# Patient Record
Sex: FEMALE | Race: BLACK OR AFRICAN AMERICAN
[De-identification: names, ages, dates, MRNs, and addresses within clinical notes are randomized per-mention and may not be internally consistent; named-entity substitution may affect disease eponyms.]

---

## 2019-04-05 ENCOUNTER — HOSPITAL ENCOUNTER (EMERGENCY)
Dept: HOSPITAL 74 - JERFT | Age: 29
Discharge: HOME | End: 2019-04-05
Payer: COMMERCIAL

## 2019-04-05 VITALS — SYSTOLIC BLOOD PRESSURE: 143 MMHG | DIASTOLIC BLOOD PRESSURE: 59 MMHG | HEART RATE: 85 BPM | TEMPERATURE: 98.2 F

## 2019-04-05 VITALS — BODY MASS INDEX: 29.3 KG/M2
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
## 2019-04-05 DIAGNOSIS — S80.212A: Primary | ICD-10-CM
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
## 2019-04-05 PROCEDURE — 3E0233Z INTRODUCTION OF ANTI-INFLAMMATORY INTO MUSCLE, PERCUTANEOUS APPROACH: ICD-10-PCS | Performed by: STUDENT IN AN ORGANIZED HEALTH CARE EDUCATION/TRAINING PROGRAM

## 2019-04-05 PROCEDURE — 3E0234Z INTRODUCTION OF SERUM, TOXOID AND VACCINE INTO MUSCLE, PERCUTANEOUS APPROACH: ICD-10-PCS | Performed by: STUDENT IN AN ORGANIZED HEALTH CARE EDUCATION/TRAINING PROGRAM

## 2022-10-23 ENCOUNTER — HOSPITAL ENCOUNTER (EMERGENCY)
Dept: HOSPITAL 74 - JER | Age: 32
Discharge: HOME | End: 2022-10-23
Payer: COMMERCIAL

## 2022-10-23 VITALS
DIASTOLIC BLOOD PRESSURE: 82 MMHG | RESPIRATION RATE: 18 BRPM | TEMPERATURE: 98 F | HEART RATE: 72 BPM | SYSTOLIC BLOOD PRESSURE: 119 MMHG

## 2022-10-23 VITALS — BODY MASS INDEX: 30.5 KG/M2

## 2022-10-23 DIAGNOSIS — O26.891: Primary | ICD-10-CM

## 2022-10-23 DIAGNOSIS — R10.30: ICD-10-CM

## 2022-10-23 DIAGNOSIS — Z3A.01: ICD-10-CM

## 2022-10-23 LAB
ALBUMIN SERPL-MCNC: 4 G/DL (ref 3.4–5)
ALP SERPL-CCNC: 30 U/L (ref 45–117)
ALT SERPL-CCNC: 19 U/L (ref 13–61)
ANION GAP SERPL CALC-SCNC: 8 MMOL/L (ref 8–16)
ANISOCYTOSIS BLD QL: (no result)
APPEARANCE UR: CLEAR
AST SERPL-CCNC: 11 U/L (ref 15–37)
BACTERIA # UR AUTO: 582 /UL (ref 0–1359)
BILIRUB SERPL-MCNC: 0.4 MG/DL (ref 0.2–1)
BILIRUB UR STRIP.AUTO-MCNC: NEGATIVE MG/DL
BUN SERPL-MCNC: 10.3 MG/DL (ref 7–18)
CALCIUM SERPL-MCNC: 9.8 MG/DL (ref 8.5–10.1)
CASTS URNS QL MICRO: 0 /UL (ref 0–3.1)
CHLORIDE SERPL-SCNC: 104 MMOL/L (ref 98–107)
CO2 SERPL-SCNC: 26 MMOL/L (ref 21–32)
COLOR UR: YELLOW
CREAT SERPL-MCNC: 0.8 MG/DL (ref 0.55–1.3)
DACRYOCYTES BLD QL SMEAR: (no result)
DEPRECATED RDW RBC AUTO: 18.6 % (ref 11.6–15.6)
EPITH CASTS URNS QL MICRO: 32 /UL (ref 0–25.1)
GLUCOSE SERPL-MCNC: 80 MG/DL (ref 74–106)
HCT VFR BLD CALC: 33.2 % (ref 32.4–45.2)
HGB BLD-MCNC: 10.5 GM/DL (ref 10.7–15.3)
KETONES UR QL STRIP: NEGATIVE
LEUKOCYTE ESTERASE UR QL STRIP.AUTO: NEGATIVE
MACROCYTES BLD QL: 0
MCH RBC QN AUTO: 18.9 PG (ref 25.7–33.7)
MCHC RBC AUTO-ENTMCNC: 31.5 G/DL (ref 32–36)
MCV RBC: 60 FL (ref 80–96)
NITRITE UR QL STRIP: NEGATIVE
PH UR: 5.5 [PH] (ref 5–8)
PLATELET # BLD AUTO: 240 10^3/UL (ref 134–434)
PMV BLD: 9.5 FL (ref 7.5–11.1)
PROT SERPL-MCNC: 8 G/DL (ref 6.4–8.2)
PROT UR QL STRIP: NEGATIVE
PROT UR QL STRIP: NEGATIVE
RBC # BLD AUTO: 151 /UL (ref 0–23.9)
RBC # BLD AUTO: 5.53 M/MM3 (ref 3.6–5.2)
RBC MORPH BLD: YES
SODIUM SERPL-SCNC: 138 MMOL/L (ref 136–145)
SP GR UR: 1.02 (ref 1.01–1.03)
TARGETS BLD QL SMEAR: (no result)
UROBILINOGEN UR STRIP-MCNC: 0.2 MG/DL (ref 0.2–1)
WBC # BLD AUTO: 8.2 K/MM3 (ref 4–10)
WBC # UR AUTO: 24 /UL (ref 0–25.8)

## 2025-01-31 NOTE — PDOC
History of Present Illness





- General


Chief Complaint: Injury


Stated Complaint: FALL/INJURY


Time Seen by Provider: 04/05/19 18:41


History Source: Patient


Exam Limitations: Clinical Condition





- History of Present Illness


Initial Comments: 





04/05/19 19:12


Patient with no significant past medical history present with complaint of 

symptoms day history of left knee pain and abrasion to left knee status post 

slip and fall going down stairs 6 days ago. Patient reported she tripped while 

going downstairs and fell hitting left knee and have an abrasion to left 

anterior knee. Patient reported increased pain with ambulation. Patient did not 

take anything for pain. Patient does not recall last tetanus vaccine


Timing/Duration: other (6 days)





Past History





- Past Medical History


Allergies/Adverse Reactions: 


 Allergies











Allergy/AdvReac Type Severity Reaction Status Date / Time


 


No Known Allergies Allergy   Verified 04/05/19 18:38











Home Medications: 


Ambulatory Orders





Cephalexin Monohydrate [Keflex -] 500 mg PO BID 7 Days #14 capsule 04/05/19 


Ibuprofen 800 mg PO Q8H PRN #20 tablet 04/05/19 


Leg Brace [Knee Brace] 1 each [facility] DAILY #1 each 04/05/19 








COPD: No





- Immunization History


Immunization Up to Date: Yes





- Suicide/Smoking/Psychosocial Hx


Smoking History: Never smoked


Hx Alcohol Use: No


Drug/Substance Use Hx: No





**Review of Systems





- Review of Systems


Able to Perform ROS?: Yes


Is the patient limited English proficient: No


Constitutional: No: Weakness


HEENTM: No: Symptoms Reported


Respiratory: No: Symptoms reported


Cardiac (ROS): No: Symptoms Reported


ABD/GI: No: Symptoms Reported


Musculoskeletal: Yes: See HPI, Joint Pain (left anterior knee), Muscle Pain (

left knee)


Integumentary: Yes: Symptoms Reported, See HPI, Other (abrasions to anterior 

left knee)


Neurological: No: Numbness, Paresthesia, Tingling, Weakness


All Other Systems: Reviewed and Negative





*Physical Exam





- Vital Signs


 Last Vital Signs











Temp Pulse Resp BP Pulse Ox


 


 98.2 F   85   16   143/59 L  99 


 


 04/05/19 18:38  04/05/19 18:38  04/05/19 18:38  04/05/19 18:38  04/05/19 18:38














- Physical Exam


Comments: 





04/05/19 19:22


GENERAL:


Well developed, well nourished. Awake and alert. No acute distress.


CARDIOVASCULAR:


Regular rate and rhythm. No murmurs, rubs, or gallops. 


PULMONARY: 


No evidence of respiratory distress. 


MUSCULOSKELETAL : Moderate tenderness over anterior left patella. Superficial 

abrasions to infrapatellar of left knee without bleeding. No joint swelling or 

effusion to left knee. No bony deformities 


SKIN: 


Warm and dry. Normal capillary refill. Superficial abrasions of anterior left 

knee to infrapatellar region. NEUROLOGICAL: 


Alert, awake, appropriate.  No motor deficits in the  lower extremities.  Gait 

is normal without ataxia.


PSYCHIATRIC: 


Cooperative. Good eye contact. Appropriate mood and affect.


General Appearance: Yes: Nourished, Appropriately Dressed.  No: Apparent 

Distress





ED Treatment Course





- RADIOLOGY


Radiology Studies Ordered: 














 Category Date Time Status


 


 KNEE 3 POS-LEFT [RAD] Stat Radiology  04/05/19 18:42 Taken














Medical Decision Making





- Medical Decision Making





04/05/19 19:19


Patient with no significant past medical history present with complaint of 

symptoms day history of left knee pain and abrasion to left knee status post 

slip and fall going down stairs 6 days ago. Patient reported she tripped while 

going downstairs and fell hitting left knee and have an abrasion to left 

anterior knee. Patient reported increased pain with ambulation. Patient did not 

take anything for pain. Patient does not recall last tetanus vaccine.


Exam significant for 4 cm circular superficial abrasion to infrapatellar aspect 

of left knee without bleeding. Moderate tenderness to anterior patella of left 

knee. No joint effusion or swelling.


Wound cleaned with Betadine and bacitracin applied to wound. Wound covered with 

adhesive bandage. X-ray of left knee shows no acute pathology of left knee. 

Toradol 60 mg IM given for pain. Tetanus vaccine given. Patient is stable for 

discharge on knee support brace with education on home wound care and Keflex 

antibiotics for infection prophylaxis with PCP follow-up





*DC/Admit/Observation/Transfer


Diagnosis at time of Disposition: 


 Abrasion, left knee, initial encounter, Left anterior knee pain








- Discharge Dispostion


Disposition: HOME


Condition at time of disposition: Stable


Decision to Admit order: No





- Prescriptions


Prescriptions: 


Cephalexin Monohydrate [Keflex -] 500 mg PO BID 7 Days #14 capsule


Ibuprofen 800 mg PO Q8H PRN #20 tablet


 PRN Reason: pain


Leg Brace [Knee Brace] 1 each [facility] DAILY #1 each





- Referrals


Referrals: 


Anoop Amaya DO [Staff Physician] - 





- Patient Instructions


Printed Discharge Instructions:  DI for Abrasion, How to Use an Elastic Bandage-

Knee Sprain, DI for Knee Sprain


Additional Instructions: 


Take medications as prescribed. take it easy on left knee. ambulate as 

tolerated and use prescribed knee brace





- Post Discharge Activity


Forms/Work/School Notes:  Back to Work Yes

## 2025-06-20 ENCOUNTER — HOSPITAL ENCOUNTER (EMERGENCY)
Dept: HOSPITAL 74 - JER | Age: 35
LOS: 1 days | Discharge: HOME | End: 2025-06-21
Payer: COMMERCIAL

## 2025-06-20 VITALS — BODY MASS INDEX: 30.4 KG/M2

## 2025-06-20 VITALS
HEART RATE: 93 BPM | TEMPERATURE: 98.8 F | RESPIRATION RATE: 18 BRPM | SYSTOLIC BLOOD PRESSURE: 139 MMHG | DIASTOLIC BLOOD PRESSURE: 84 MMHG

## 2025-06-20 DIAGNOSIS — R53.81: ICD-10-CM

## 2025-06-20 DIAGNOSIS — R53.83: ICD-10-CM

## 2025-06-20 DIAGNOSIS — N12: Primary | ICD-10-CM

## 2025-06-20 DIAGNOSIS — R10.84: ICD-10-CM

## 2025-06-20 DIAGNOSIS — R11.2: ICD-10-CM

## 2025-06-20 DIAGNOSIS — R63.8: ICD-10-CM

## 2025-06-20 LAB
ALBUMIN SERPL-MCNC: 4.3 G/DL (ref 3.4–5)
ALP SERPL-CCNC: 39 U/L (ref 45–117)
ALT SERPL-CCNC: 16 U/L (ref 13–61)
ANION GAP SERPL CALC-SCNC: 8 MMOL/L (ref 4–13)
APPEARANCE UR: CLEAR
AST SERPL-CCNC: 13 U/L (ref 15–37)
BACTERIA # UR AUTO: 752 /UL (ref 0–1359)
BASOPHILS # BLD AUTO: 0.02 X10^3/UL (ref 0.01–0.08)
BILIRUB SERPL-MCNC: 0.6 MG/DL (ref 0.2–1)
BILIRUB UR STRIP.AUTO-MCNC: NEGATIVE MG/DL
BUN SERPL-MCNC: 9.5 MG/DL (ref 7–18)
CALCIUM SERPL-MCNC: 10 MG/DL (ref 8.5–10.1)
CASTS URNS QL MICRO: 0 /UL (ref 0–3.1)
CHLORIDE SERPL-SCNC: 105 MMOL/L (ref 98–107)
CO2 SERPL-SCNC: 26 MMOL/L (ref 21–32)
COLOR UR: YELLOW
CREAT SERPL-MCNC: 0.9 MG/DL (ref 0.55–1.3)
CRYSTALS URNS QL MICRO: (no result) /HPF
EOSINOPHIL # BLD AUTO: 0.04 X10^3/UL (ref 0.04–0.36)
EOSINOPHIL NFR BLD AUTO: 0.6 % (ref 0.7–5.8)
EPITH CASTS URNS QL MICRO: 25 /UL (ref 0–25.1)
ERYTHROCYTE [DISTWIDTH] IN BLOOD: 18 % (ref 12.1–16.8)
GLUCOSE SERPL-MCNC: 85 MG/DL (ref 74–106)
HCT VFR BLD CALC: 36.6 % (ref 34.1–44.9)
HCV IGG SERPL QL IA: (no result)
HGB BLD-MCNC: 11.1 G/DL (ref 11.2–15.7)
HIV 1+2 AB+HIV1 P24 AG SERPL QL IA: NEGATIVE
IMM GRANULOCYTES # BLD: 0.01 X10^3/UL (ref 0–0.03)
KETONES UR QL STRIP: (no result)
LEUKOCYTE ESTERASE UR QL STRIP.AUTO: (no result)
MAGNESIUM SERPL-MCNC: 2.1 MG/DL (ref 1.8–2.4)
MCHC RBC-ENTMCNC: 30.3 G/DL (ref 32.2–35.5)
MCV RBC: 59.2 FL (ref 79.4–94.8)
MONOCYTES # BLD AUTO: 0.42 X10^3/UL (ref 0.24–0.86)
MONOCYTES NFR BLD AUTO: 6.5 % (ref 4.7–12.5)
NITRITE UR QL STRIP: NEGATIVE
PH UR: 5.5 [PH] (ref 5–8)
PLATELET # BLD AUTO: 166 X10^3/UL (ref 182–369)
PLATELETS.RETICULATED NFR BLD AUTO: (no result) % (ref 0.9–11.2)
PLATELETS.RETICULATED NFR BLD AUTO: (no result) X10^3/UL
PMV BLD: (no result) FL (ref 9.4–12.3)
POTASSIUM SERPLBLD-SCNC: 4 MMOL/L (ref 3.5–5.1)
PROT SERPL-MCNC: 8.3 G/DL (ref 6.4–8.2)
PROT UR QL STRIP: NEGATIVE
PROT UR QL STRIP: NEGATIVE
RBC # BLD AUTO: 14 /UL (ref 0–23.9)
SODIUM SERPL-SCNC: 138 MMOL/L (ref 136–145)
SP GR UR: 1.02 (ref 1.01–1.03)
UROBILINOGEN UR STRIP-MCNC: 1 MG/DL (ref 0.2–1)
WBC # UR AUTO: 17 /UL (ref 0–25.8)
YEAST BUDDING URNS QL: (no result)

## 2025-06-20 PROCEDURE — 3E033NZ INTRODUCTION OF ANALGESICS, HYPNOTICS, SEDATIVES INTO PERIPHERAL VEIN, PERCUTANEOUS APPROACH: ICD-10-PCS

## 2025-06-20 PROCEDURE — 3E03329 INTRODUCTION OF OTHER ANTI-INFECTIVE INTO PERIPHERAL VEIN, PERCUTANEOUS APPROACH: ICD-10-PCS

## 2025-06-20 PROCEDURE — 3E033GC INTRODUCTION OF OTHER THERAPEUTIC SUBSTANCE INTO PERIPHERAL VEIN, PERCUTANEOUS APPROACH: ICD-10-PCS

## 2025-06-20 RX ADMIN — SODIUM CHLORIDE, POTASSIUM CHLORIDE, SODIUM LACTATE AND CALCIUM CHLORIDE ONE ML: 600; 310; 30; 20 INJECTION, SOLUTION INTRAVENOUS at 21:14

## 2025-06-20 RX ADMIN — ACETAMINOPHEN ONE MG: 10 INJECTION, SOLUTION INTRAVENOUS at 21:07

## 2025-06-20 RX ADMIN — ONDANSETRON ONE MG: 2 INJECTION INTRAMUSCULAR; INTRAVENOUS at 21:07

## 2025-06-20 RX ADMIN — FAMOTIDINE ONE MLS/HR: 20 INJECTION, SOLUTION INTRAVENOUS at 21:07

## 2025-06-20 RX ADMIN — SODIUM CHLORIDE ONE ML: 9 INJECTION, SOLUTION INTRAVENOUS at 23:39

## 2025-06-20 RX ADMIN — METOCLOPRAMIDE ONE MG: 5 INJECTION, SOLUTION INTRAMUSCULAR; INTRAVENOUS at 23:39

## 2025-06-21 RX ADMIN — CEFTRIAXONE ONE MLS/HR: 500 INJECTION, POWDER, FOR SOLUTION INTRAMUSCULAR; INTRAVENOUS at 01:30
